# Patient Record
Sex: FEMALE | Race: BLACK OR AFRICAN AMERICAN | NOT HISPANIC OR LATINO | Employment: STUDENT | URBAN - METROPOLITAN AREA
[De-identification: names, ages, dates, MRNs, and addresses within clinical notes are randomized per-mention and may not be internally consistent; named-entity substitution may affect disease eponyms.]

---

## 2017-07-26 ENCOUNTER — OFFICE VISIT (OUTPATIENT)
Dept: OBSTETRICS AND GYNECOLOGY | Facility: CLINIC | Age: 22
End: 2017-07-26
Attending: OBSTETRICS & GYNECOLOGY
Payer: COMMERCIAL

## 2017-07-26 VITALS
BODY MASS INDEX: 25.07 KG/M2 | WEIGHT: 136.25 LBS | DIASTOLIC BLOOD PRESSURE: 70 MMHG | SYSTOLIC BLOOD PRESSURE: 104 MMHG | HEIGHT: 62 IN

## 2017-07-26 DIAGNOSIS — Z23 NEED FOR HPV VACCINATION: Primary | ICD-10-CM

## 2017-07-26 DIAGNOSIS — N89.8 VAGINAL DISCHARGE: ICD-10-CM

## 2017-07-26 PROCEDURE — 87480 CANDIDA DNA DIR PROBE: CPT

## 2017-07-26 PROCEDURE — 90651 9VHPV VACCINE 2/3 DOSE IM: CPT | Mod: S$GLB,,, | Performed by: OBSTETRICS & GYNECOLOGY

## 2017-07-26 PROCEDURE — 90471 IMMUNIZATION ADMIN: CPT | Mod: S$GLB,,, | Performed by: OBSTETRICS & GYNECOLOGY

## 2017-07-26 PROCEDURE — 99204 OFFICE O/P NEW MOD 45 MIN: CPT | Mod: 25,S$GLB,, | Performed by: OBSTETRICS & GYNECOLOGY

## 2017-07-26 PROCEDURE — 87660 TRICHOMONAS VAGIN DIR PROBE: CPT

## 2017-07-26 PROCEDURE — 99999 PR PBB SHADOW E&M-NEW PATIENT-LVL II: CPT | Mod: PBBFAC,,, | Performed by: OBSTETRICS & GYNECOLOGY

## 2017-07-26 NOTE — PROGRESS NOTES
Subjective:       Patient ID: Veronica Overton is a 21 y.o. female.    Chief Complaint:  Vaginal Discharge and Dysmenorrhea      History of Present Illness  HPI  This 21 yr old G0 female is here for first GYN exam and is having a discharge and having painful menses.  She takes aleve for this and does not desire OCPs.  We discussed that side effects of OCps help with pain and amt of bleeding etc.  She desires HPV vaccine and states she did not have at ped. Office when younger.  She also complains of vaginal itching and white and clumpy discharge for about a months.  She is not nor never has had intercourse.  Very tense about this exam  Does not wear tampons    GYN & OB History  Patient's last menstrual period was 2017.   Date of Last Pap: No result found    OB History    Para Term  AB Living   0             SAB TAB Ectopic Multiple Live Births                         Review of Systems  Review of Systems   Constitutional: Negative for chills and fever.   Respiratory: Negative for shortness of breath.    Cardiovascular: Negative for chest pain.   Gastrointestinal: Negative for abdominal pain, nausea and vomiting.   Genitourinary: Positive for vaginal discharge. Negative for difficulty urinating, dyspareunia, genital sores, menstrual problem, pelvic pain, vaginal bleeding and vaginal pain.   Skin: Negative for wound.   Hematological: Negative for adenopathy.           Objective:    Physical Exam     Unable to do pelvic exam.  Pt did not tolerate but did get affirm.  Normal external genetalia     Assessment:        1. Need for HPV vaccination    2. Vaginal discharge               Plan:      Unable to do pelvic speculum exam and only got the affirm

## 2017-07-27 LAB
CANDIDA RRNA VAG QL PROBE: NEGATIVE
G VAGINALIS RRNA GENITAL QL PROBE: POSITIVE
T VAGINALIS RRNA GENITAL QL PROBE: NEGATIVE

## 2017-07-28 DIAGNOSIS — B96.89 BV (BACTERIAL VAGINOSIS): Primary | ICD-10-CM

## 2017-07-28 DIAGNOSIS — N76.0 BV (BACTERIAL VAGINOSIS): Primary | ICD-10-CM

## 2017-07-28 RX ORDER — TINIDAZOLE 500 MG/1
2 TABLET ORAL DAILY
Qty: 8 TABLET | Refills: 2 | Status: SHIPPED | OUTPATIENT
Start: 2017-07-28 | End: 2017-07-30

## 2017-09-07 ENCOUNTER — CLINICAL SUPPORT (OUTPATIENT)
Dept: OBSTETRICS AND GYNECOLOGY | Facility: CLINIC | Age: 22
End: 2017-09-07
Payer: COMMERCIAL

## 2017-09-07 DIAGNOSIS — Z23 NEED FOR HPV VACCINATION: Primary | ICD-10-CM

## 2017-09-07 PROCEDURE — 90471 IMMUNIZATION ADMIN: CPT | Mod: S$GLB,,, | Performed by: OBSTETRICS & GYNECOLOGY

## 2017-09-07 PROCEDURE — 90651 9VHPV VACCINE 2/3 DOSE IM: CPT | Mod: S$GLB,,, | Performed by: OBSTETRICS & GYNECOLOGY

## 2017-09-07 NOTE — PROGRESS NOTES
Here for Gardasil # 2   injection, without complaint at this time. Reports no pain before or after injection. Advised to wait in lobby 15 minutes after injection and report any adverse reactions. Return to clinic in          for next injection.         Site: LD

## 2018-02-16 ENCOUNTER — CLINICAL SUPPORT (OUTPATIENT)
Dept: OBSTETRICS AND GYNECOLOGY | Facility: CLINIC | Age: 23
End: 2018-02-16
Payer: COMMERCIAL

## 2018-02-16 DIAGNOSIS — Z23 NEED FOR HPV VACCINATION: Primary | ICD-10-CM

## 2018-02-16 PROCEDURE — 90471 IMMUNIZATION ADMIN: CPT | Mod: S$GLB,,, | Performed by: OBSTETRICS & GYNECOLOGY

## 2018-02-16 PROCEDURE — 99999 PR PBB SHADOW E&M-EST. PATIENT-LVL I: CPT | Mod: PBBFAC,,,

## 2018-02-16 PROCEDURE — 90651 9VHPV VACCINE 2/3 DOSE IM: CPT | Mod: S$GLB,,, | Performed by: OBSTETRICS & GYNECOLOGY

## 2019-06-25 ENCOUNTER — TELEPHONE (OUTPATIENT)
Dept: OBSTETRICS AND GYNECOLOGY | Facility: CLINIC | Age: 24
End: 2019-06-25

## 2019-06-25 NOTE — TELEPHONE ENCOUNTER
----- Message from Bouchra Roberto sent at 6/25/2019 11:11 AM CDT -----  Contact: self  Patient is calling to get a copy of her immunization records. Patient can be reached at [phone]

## 2020-08-03 ENCOUNTER — OFFICE VISIT (OUTPATIENT)
Dept: OBSTETRICS AND GYNECOLOGY | Facility: CLINIC | Age: 25
End: 2020-08-03
Payer: COMMERCIAL

## 2020-08-03 VITALS
WEIGHT: 148.81 LBS | BODY MASS INDEX: 27.38 KG/M2 | SYSTOLIC BLOOD PRESSURE: 102 MMHG | DIASTOLIC BLOOD PRESSURE: 62 MMHG | HEIGHT: 62 IN

## 2020-08-03 DIAGNOSIS — N89.8 VAGINAL ODOR: ICD-10-CM

## 2020-08-03 DIAGNOSIS — Z01.419 ENCOUNTER FOR GYNECOLOGICAL EXAMINATION WITHOUT ABNORMAL FINDING: Primary | ICD-10-CM

## 2020-08-03 DIAGNOSIS — N94.6 DYSMENORRHEA: ICD-10-CM

## 2020-08-03 PROCEDURE — 99395 PR PREVENTIVE VISIT,EST,18-39: ICD-10-PCS | Mod: S$GLB,,, | Performed by: OBSTETRICS & GYNECOLOGY

## 2020-08-03 PROCEDURE — 87491 CHLMYD TRACH DNA AMP PROBE: CPT

## 2020-08-03 PROCEDURE — 87480 CANDIDA DNA DIR PROBE: CPT

## 2020-08-03 PROCEDURE — 87510 GARDNER VAG DNA DIR PROBE: CPT

## 2020-08-03 PROCEDURE — 99999 PR PBB SHADOW E&M-EST. PATIENT-LVL III: ICD-10-PCS | Mod: PBBFAC,,, | Performed by: OBSTETRICS & GYNECOLOGY

## 2020-08-03 PROCEDURE — 99395 PREV VISIT EST AGE 18-39: CPT | Mod: S$GLB,,, | Performed by: OBSTETRICS & GYNECOLOGY

## 2020-08-03 PROCEDURE — 99999 PR PBB SHADOW E&M-EST. PATIENT-LVL III: CPT | Mod: PBBFAC,,, | Performed by: OBSTETRICS & GYNECOLOGY

## 2020-08-03 RX ORDER — TINIDAZOLE 500 MG/1
TABLET ORAL
Qty: 8 TABLET | Refills: 1 | Status: SHIPPED | OUTPATIENT
Start: 2020-08-03 | End: 2022-06-08

## 2020-08-03 RX ORDER — DROSPIRENONE AND ETHINYL ESTRADIOL 0.02-3(28)
1 KIT ORAL DAILY
Qty: 28 TABLET | Refills: 12 | Status: SHIPPED | OUTPATIENT
Start: 2020-08-03 | End: 2022-06-08

## 2020-08-03 RX ORDER — NAPROXEN SODIUM 550 MG/1
550 TABLET ORAL
Qty: 30 TABLET | Refills: 12 | Status: SHIPPED | OUTPATIENT
Start: 2020-08-03 | End: 2022-06-08

## 2020-08-03 NOTE — PROGRESS NOTES
8/3/2020    Candida sp Negative   Gardnerella vaginalis Positive (A)   Trichomonas vaginalis Negative   BV    PT HERE FOR GYN EXAM. NEVER SEXUALLY ACTIVE.  HAS SEVERE CRAMPS WITH MENSES. TAKES ALIEVE.  HAS VULVAR IRRITATION WITH VAGINAL ODOR.    ROS:  GENERAL: No fever, chills, fatigability or weight loss.  VULVAR: No pain, no lesions and no itching.  VAGINAL: No relaxation, no itching, no discharge, no abnormal bleeding and no lesions.  ABDOMEN: No abdominal pain. Denies nausea. Denies vomiting. No diarrhea. No constipation  BREAST: Denies pain. No lumps. No discharge.  URINARY: No incontinence, no nocturia, no frequency and no dysuria.  CARDIOVASCULAR: No chest pain. No shortness of breath. No leg cramps.  NEUROLOGICAL: No headaches. No vision changes.  The remainder of the review of systems was negative.    PE:  General Appearance: overweight And Well developed. Well nourished. In no acute distress.  Vulva: Lesions: No.  Urethral Meatus: Normal size. Normal location. No lesions. No prolapse.  Urethra: No masses. No tenderness. No prolapse. No scarring.  Bladder: No masses. No tenderness.  Vagina: Mucosa NI:no discharge no, atrophy no, cystocele no or rectocele no. DID NOT PERFORM SPEC.  Breasts: PT DEFFERED.  Neck: No thyroid enlargement. No thyroid masses.  Skin: Rashes: No  CHEST: CTA B  HEART: RRR  EXT: NO EDEMA        PROCEDURES:    PLAN:     DIAGNOSIS:  1. Encounter for gynecological examination without abnormal finding    2. Vaginal odor    3. Dysmenorrhea        MEDICATIONS & ORDERS:  Orders Placed This Encounter    C. trachomatis/N. gonorrhoeae by AMP DNA    Vaginosis Screen by DNA Probe    tinidazole (TINDAMAX) 500 MG tablet    drospirenone-ethinyl estradioL (JORDANA) 3-0.02 mg per tablet    naproxen sodium (ANAPROX) 550 MG tablet       Patient was counseled today on the new ACS guidelines for cervical cytology screening as well as the current recommendations for breast cancer screening. She was  counseled to follow up with her PCP for other routine health maintenance. Counseling session lasted approximately 10 minutes, and all her questions were answered.         FOLLOW-UP: With me in 12 month

## 2020-08-06 LAB
C TRACH DNA SPEC QL NAA+PROBE: NOT DETECTED
N GONORRHOEA DNA SPEC QL NAA+PROBE: NOT DETECTED

## 2024-02-23 ENCOUNTER — TELEPHONE (OUTPATIENT)
Dept: OBSTETRICS AND GYNECOLOGY | Facility: CLINIC | Age: 29
End: 2024-02-23
Payer: COMMERCIAL

## 2024-02-23 NOTE — TELEPHONE ENCOUNTER
Spoke with patient and notified that appointment on 3/1 will need to be rescheduled due to provider not being in clinic. Patient verbalized understanding and is rescheduled for 3/8 at [facility].

## 2024-03-15 ENCOUNTER — OFFICE VISIT (OUTPATIENT)
Dept: OBSTETRICS AND GYNECOLOGY | Facility: CLINIC | Age: 29
End: 2024-03-15
Payer: COMMERCIAL

## 2024-03-15 VITALS
HEIGHT: 62 IN | WEIGHT: 150.88 LBS | DIASTOLIC BLOOD PRESSURE: 66 MMHG | BODY MASS INDEX: 27.77 KG/M2 | SYSTOLIC BLOOD PRESSURE: 110 MMHG

## 2024-03-15 DIAGNOSIS — Z12.4 SCREENING FOR CERVICAL CANCER: ICD-10-CM

## 2024-03-15 DIAGNOSIS — N92.0 MENORRHAGIA WITH REGULAR CYCLE: Primary | ICD-10-CM

## 2024-03-15 DIAGNOSIS — Z01.419 ENCOUNTER FOR GYNECOLOGICAL EXAMINATION WITHOUT ABNORMAL FINDING: ICD-10-CM

## 2024-03-15 PROCEDURE — 99999 PR PBB SHADOW E&M-EST. PATIENT-LVL IV: CPT | Mod: PBBFAC,,, | Performed by: NURSE PRACTITIONER

## 2024-03-15 PROCEDURE — 99385 PREV VISIT NEW AGE 18-39: CPT | Mod: S$GLB,,, | Performed by: NURSE PRACTITIONER

## 2024-03-15 RX ORDER — LEVONORGESTREL / ETHINYL ESTRADIOL AND ETHINYL ESTRADIOL 150-30(84)
1 KIT ORAL DAILY
Qty: 91 EACH | Refills: 3 | Status: SHIPPED | OUTPATIENT
Start: 2024-03-15 | End: 2025-03-15

## 2024-03-15 NOTE — PROGRESS NOTES
"CC: Well woman exam    Veronica Overton is a 28 y.o. female  presents for well woman exam and per her pcp has anemia and iron deficiency appears pt has had for many years.   Pt saw Dr. Garcia gyn in  for same and placed on ocp - pt states she stopped them as she felt did not need them   Pt last CBD in January was normal but ferritin was low    Pt states cycle is heavy - has 5 flow days with day 3 being the heaviest    Pt has attempted sexual contact in her early 20's but was too painful upon insertion she had to stop and has never tried again  Does use tampons when she swims     Discussed pap - educated on how will be preformed and will stop at any point if pt does not tolerate       LMP: Patient's last menstrual period was 2024..          History reviewed. No pertinent past medical history.  History reviewed. No pertinent surgical history.  Social History     Socioeconomic History    Marital status: Single    Number of children: 0   Tobacco Use    Smoking status: Never    Smokeless tobacco: Never   Substance and Sexual Activity    Alcohol use: No    Drug use: No    Sexual activity: Never     Birth control/protection: None     Family History   Problem Relation Age of Onset    Amblyopia Neg Hx     Blindness Neg Hx     Cancer Neg Hx     Cataracts Neg Hx     Diabetes Neg Hx     Glaucoma Neg Hx     Hypertension Neg Hx     Macular degeneration Neg Hx     Retinal detachment Neg Hx     Strabismus Neg Hx     Stroke Neg Hx     Thyroid disease Neg Hx     Breast cancer Neg Hx     Colon cancer Neg Hx     Eclampsia Neg Hx      OB History          0    Para        Term                AB        Living             SAB        IAB        Ectopic        Multiple        Live Births                     /66   Ht 5' 2" (1.575 m)   Wt 68.5 kg (150 lb 14.5 oz)   LMP 2024   BMI 27.60 kg/m²       ROS:  Per hpi    PHYSICAL EXAM:  APPEARANCE: Well nourished, well developed, in no acute distress.  AFFECT: " WNL, alert and oriented x 3  SKIN: No acne or hirsutism  NECK: Neck symmetric without masses or thyromegaly  NODES: No inguinal, cervical, axillary, or femoral lymph node enlargement  CHEST: Good respiratory effect  ABDOMEN: Soft.  No tenderness or masses.  No hepatosplenomegaly.  No hernias.  BREASTS: Symmetrical, no skin changes or visible lesions.  No palpable masses, nipple discharge bilaterally.  PELVIC: Normal external genitalia without lesions.  Normal hair distribution.  Adequate perineal body, normal urethral meatus. Attempted to place pediatric speculum and with just touching vaginal area pt wanted to stop exam - exam was stopped  EXTREMITIES: No edema.  Physical Exam    1. Menorrhagia with regular cycle  Ambulatory referral/consult to Gynecology    US Pelvis Comp with Transvag NON-OB (xpd    L norgest/e.estradioL-e.estrad (SEASONIQUE) 0.15 mg-30 mcg (84)/10 mcg (7) 3MPk      2. Screening for cervical cancer  Ambulatory referral/consult to Gynecology      3. Encounter for gynecological examination without abnormal finding         AND PLAN:    Veronica was seen today for well woman.    Diagnoses and all orders for this visit:    Menorrhagia with regular cycle  -     Ambulatory referral/consult to Gynecology  -     US Pelvis Comp with Transvag NON-OB (xpd; Future  -     L norgest/e.estradioL-e.estrad (SEASONIQUE) 0.15 mg-30 mcg (84)/10 mcg (7) 3MPk; Take 1 tablet by mouth once daily.    Screening for cervical cancer  -     Ambulatory referral/consult to Gynecology    Encounter for gynecological examination without abnormal finding     Discussed with pt even though not sexually active use ocp for many things especially heavy bleeding  Recommend delaying or stopping cycle and pt is open to trying 3 mths ocp pill  Will do pelvic u/s external only to see what uterus and ovaries appear     Patient was counseled today on A.C.S. Pap guidelines and recommendations for yearly pelvic exams, mammograms and monthly self  breast exams; to see her PCP for other health maintenance.

## 2024-04-05 ENCOUNTER — HOSPITAL ENCOUNTER (OUTPATIENT)
Dept: RADIOLOGY | Facility: HOSPITAL | Age: 29
Discharge: HOME | End: 2024-04-05
Attending: NURSE PRACTITIONER
Payer: COMMERCIAL

## 2024-04-05 DIAGNOSIS — N92.0 MENORRHAGIA WITH REGULAR CYCLE: ICD-10-CM

## 2024-04-19 ENCOUNTER — HOSPITAL ENCOUNTER (OUTPATIENT)
Dept: RADIOLOGY | Facility: HOSPITAL | Age: 29
Discharge: HOME | End: 2024-04-19
Attending: NURSE PRACTITIONER
Payer: COMMERCIAL

## 2024-04-19 DIAGNOSIS — N92.0 MENORRHAGIA WITH REGULAR CYCLE: ICD-10-CM

## 2024-04-19 PROCEDURE — 76856 US EXAM PELVIC COMPLETE: CPT | Mod: TC

## 2024-04-19 PROCEDURE — 76856 US EXAM PELVIC COMPLETE: CPT | Mod: 26,,, | Performed by: RADIOLOGY

## 2024-04-26 ENCOUNTER — TELEPHONE (OUTPATIENT)
Dept: OBSTETRICS AND GYNECOLOGY | Facility: CLINIC | Age: 29
End: 2024-04-26
Payer: COMMERCIAL

## 2024-04-26 NOTE — TELEPHONE ENCOUNTER
----- Message from Alejandro Godinez MD sent at 4/26/2024  7:40 AM CDT -----  I would schedule a visit to consult with me  ----- Message -----  From: Kassandra Almonte NP  Sent: 4/25/2024   3:35 PM CDT  To: Alejandro Godinez MD    I thought I had messaged you about this pt already but never hear back.  Ultrasound shows very thickened endometrium.  I was unable to do pelvic exam on her - she did not tolerate exam    I did start her on seasonique to see if helped with bleeding and her iron def anemia.     Do you need to see her?  I doubt an exam can be done unless under sedation.    Kassandra

## 2024-04-26 NOTE — TELEPHONE ENCOUNTER
Please call pt to schedule a visit to consult on her bleeding and review of ultrasound findings with DR. Godinez

## 2024-05-03 ENCOUNTER — OFFICE VISIT (OUTPATIENT)
Dept: OBSTETRICS AND GYNECOLOGY | Facility: CLINIC | Age: 29
End: 2024-05-03
Payer: COMMERCIAL

## 2024-05-03 VITALS
WEIGHT: 152.13 LBS | BODY MASS INDEX: 27.99 KG/M2 | DIASTOLIC BLOOD PRESSURE: 68 MMHG | SYSTOLIC BLOOD PRESSURE: 90 MMHG | HEIGHT: 62 IN

## 2024-05-03 DIAGNOSIS — N92.0 MENORRHAGIA WITH REGULAR CYCLE: Primary | ICD-10-CM

## 2024-05-03 PROCEDURE — 99213 OFFICE O/P EST LOW 20 MIN: CPT | Mod: S$GLB,,, | Performed by: OBSTETRICS & GYNECOLOGY

## 2024-05-03 PROCEDURE — 99999 PR PBB SHADOW E&M-EST. PATIENT-LVL III: CPT | Mod: PBBFAC,,, | Performed by: OBSTETRICS & GYNECOLOGY

## 2024-05-03 NOTE — PROGRESS NOTES
Subjective     Patient ID: Veronica Overton is a 28 y.o. female.    Chief Complaint:  Consult (Follow up medication/)      History of Present Illness  HPI  Dysfunctional Uterine Bleeding  Patient was referred by NP for evaluation of heavy menses. She had been bleeding regularly. She is now bleeding every 28 days and menses are lasting 5 days. She changes her pad or tampon/pad every 2 hours. Dysmenorrhea:moderate, occurring first 1-2 days of flow. Cyclic symptoms include: none. Current contraception: abstinence.  Pt did not get a pelvic exam due to pt discomfort per documentation.  Pt reports a single attempt at intercourse 9 yrs ago but had to stop due to discomfort.  Has not tried since.  Pt had never had a pelvic exam or pap.  Pt was started on continuous OCP and is currently starting her 3rd month without issues.    GYN & OB History  No LMP recorded.   Date of Last Pap: No result found    OB History    Para Term  AB Living   0             SAB IAB Ectopic Multiple Live Births                   Review of Systems  Review of Systems   Constitutional:  Negative for activity change, appetite change, chills, fatigue, fever and unexpected weight change.   Respiratory:  Negative for shortness of breath.    Cardiovascular:  Negative for chest pain, palpitations and leg swelling.   Gastrointestinal:  Negative for abdominal pain, bloating, blood in stool, constipation, diarrhea, nausea and vomiting.   Genitourinary:  Positive for menorrhagia and menstrual problem. Negative for dysmenorrhea, dyspareunia, dysuria, flank pain, frequency, genital sores, hematuria, pelvic pain, urgency, vaginal bleeding, vaginal discharge, vaginal pain, urinary incontinence, postcoital bleeding, vaginal dryness and vaginal odor.   Musculoskeletal:  Negative for back pain.   Neurological:  Negative for syncope and headaches.          Objective   Physical Exam:   Constitutional: She is oriented to person, place, and time. She appears  well-developed and well-nourished. No distress.               Genitourinary:    Genitourinary Comments: Pt declines examination                 Neurological: She is alert and oriented to person, place, and time.     Psychiatric: She has a normal mood and affect. Her behavior is normal. Thought content normal.        Lab Results   Component Value Date    WBC 4.1 01/24/2024    HGB 13.0 01/24/2024    HCT 38.1 01/24/2024    MCV 96 01/24/2024     01/24/2024       US Pelvis Complete Non OB  Narrative: EXAMINATION:  US PELVIS COMPLETE NON OB    CLINICAL HISTORY:  Excessive and frequent menstruation with regular cycle    TECHNIQUE:  Transabdominal sonography of the pelvis was performed.    COMPARISON:  None.    FINDINGS:  Uterus:    Size: 8.8 x 5.1 x 6.0 cm    Masses: None    Endometrium: Endometrium measures 30 mm.    Right ovary:    Size: 3 x 1 x 2 cm    Appearance: Normal    Vascular flow: Normal.    Left ovary:    Not visualized    Free Fluid:    None.  Impression: Uniformly thickened endometrium which may relate to endometrial hyperplasia.  Other entities possible.  Appropriate follow-up and clinical correlation recommended.    Electronically signed by: Kit Caal MD  Date:    04/21/2024  Time:    12:48            Assessment and Plan     1. Menorrhagia with regular cycle           Plan:  Menorrhagia with regular cycle  -    Pt has been doing well on continous OCP.  Pt was counseled on lab and US results, which are overall stable.  EMS thickening noted on US may be related to pt's menses at time of study.  Pt is otherwise at low-average risk for hyperplasia.  Pt counseled on importance of examination and pap.  Risks of not obtaining this information was discussed, including possibility of having undiagnosed malignancy or dysplasia.  Pt voiced understanding and still declines examination today.  Pt counseled on alternatives to perform examination including use of sedation, anxiolytics, or anesthesia in OR.   Pt will think about it.  In the mean time, recommend that she continue with OCP as Rx'd by NP and return for sooner visit if symptoms worsen or fail to improve.      Follow up if symptoms worsen or fail to improve.